# Patient Record
Sex: MALE | Race: WHITE | Employment: UNEMPLOYED | URBAN - NONMETROPOLITAN AREA
[De-identification: names, ages, dates, MRNs, and addresses within clinical notes are randomized per-mention and may not be internally consistent; named-entity substitution may affect disease eponyms.]

---

## 2018-08-09 ENCOUNTER — OFFICE VISIT (OUTPATIENT)
Dept: PSYCHIATRY | Age: 23
End: 2018-08-09
Payer: MEDICAID

## 2018-08-09 DIAGNOSIS — F90.0 ATTENTION DEFICIT HYPERACTIVITY DISORDER (ADHD), PREDOMINANTLY INATTENTIVE TYPE: Primary | ICD-10-CM

## 2018-08-09 DIAGNOSIS — F84.5 ASPERGER'S DISORDER: ICD-10-CM

## 2018-08-09 PROCEDURE — 4004F PT TOBACCO SCREEN RCVD TLK: CPT | Performed by: PSYCHIATRY & NEUROLOGY

## 2018-08-09 PROCEDURE — 90792 PSYCH DIAG EVAL W/MED SRVCS: CPT | Performed by: PSYCHIATRY & NEUROLOGY

## 2018-09-04 DIAGNOSIS — F90.9 ADULT ATTENTION DEFICIT HYPERACTIVITY DISORDER: Primary | ICD-10-CM

## 2018-09-04 NOTE — TELEPHONE ENCOUNTER
Veronica Man called on behalf of patient requesting refill of Concerta XR 54 mg and Risperdal 1 mg.  Veronica Man is asking if medication can be sent to [facility] Appointment Date: 8/9/2018  Next Appointment Date: 9/6/2018

## 2018-09-05 RX ORDER — RISPERIDONE 1 MG/1
1 TABLET, FILM COATED ORAL 2 TIMES DAILY
Qty: 60 TABLET | Refills: 3 | Status: SHIPPED | OUTPATIENT
Start: 2018-09-05 | End: 2019-01-05 | Stop reason: SDUPTHER

## 2018-09-05 RX ORDER — METHYLPHENIDATE HYDROCHLORIDE 54 MG/1
54 TABLET ORAL EVERY MORNING
Qty: 30 TABLET | Refills: 0 | Status: SHIPPED | OUTPATIENT
Start: 2018-09-05 | End: 2018-10-08 | Stop reason: SDUPTHER

## 2018-09-06 ENCOUNTER — OFFICE VISIT (OUTPATIENT)
Dept: PSYCHIATRY | Age: 23
End: 2018-09-06
Payer: MEDICAID

## 2018-09-06 DIAGNOSIS — F90.8 ADHD, ADULT RESIDUAL TYPE: ICD-10-CM

## 2018-09-06 DIAGNOSIS — F84.5 ASPERGER'S DISORDER: Primary | ICD-10-CM

## 2018-09-06 PROCEDURE — G8421 BMI NOT CALCULATED: HCPCS | Performed by: PSYCHIATRY & NEUROLOGY

## 2018-09-06 PROCEDURE — 99212 OFFICE O/P EST SF 10 MIN: CPT | Performed by: PSYCHIATRY & NEUROLOGY

## 2018-09-06 PROCEDURE — 4004F PT TOBACCO SCREEN RCVD TLK: CPT | Performed by: PSYCHIATRY & NEUROLOGY

## 2018-09-06 PROCEDURE — G8428 CUR MEDS NOT DOCUMENT: HCPCS | Performed by: PSYCHIATRY & NEUROLOGY

## 2018-09-06 NOTE — PROGRESS NOTES
Chief Complaint   Patient presents with    1 Month Follow-Up     Asperger's ADHD     Umu Barrera returned after 30 days to follow-up on Asperger's disorder and ADHD. He just got refills of his Concerta and Risperdal yesterday. He has remaining refills of trazodone he was well fixed for medications. He denied any problems with side effects and does describe good benefits with these. He was complaining of his legs itching around the ankles. He speculated that he might of been into some weeds or poison ivy on his walk (he is walking about 10 miles daily). This appears to be a localized and not a generalized rash. That's more consistent with some local irritant than it is with a drug reaction. It did not look like poison ivy however. More like some mosquito bites. I made no changes. He does want to continue coming in at monthly intervals.   He is also seeing Dhara Stewart PhD.    Mental Status Examination    Level of consciousness:  within normal limits  Appearance:  well-appearing, street clothes, in chair, good grooming and good hygiene  Behavior/Motor:  no abnormalities noted  Attitude toward examiner:  cooperative, attentive and poor eye contact  Speech:  spontaneous, normal rate, normal volume and well articulated  Mood:  euthymic  Affect:  mood congruent  Thought processes:  linear, goal directed and coherent  Thought content:  Homocidal ideation: none  Suicidal Ideation:  denies suicidal ideation  Delusions:  no evidence of delusions  Perceptual Disturbance:  denies any perceptual disturbance  Cognition:  oriented to person, place, and time  Concentration succeeded  Memory intact  Fund of knowledge:  good  Abstract thinking:  good  Insight:  good  Judgment:  good    DIAGNOSTIC IMPRESSION  Asperger's disorder  ADHD residual    Plan    No changes  Current Outpatient Prescriptions   Medication Sig Dispense Refill    methylphenidate (CONCERTA) 54 MG extended release tablet Take 1 tablet by mouth every morning for 30 days. . 30 tablet 0    risperiDONE (RISPERDAL) 1 MG tablet Take 1 tablet by mouth 2 times daily 60 tablet 3    methylPREDNISolone (MEDROL, JEREMY,) 4 MG tablet Take by mouth, with food as directed 1 kit 0    B Complex Vitamins (VITAMIN B COMPLEX PO) Take  by mouth.  Multiple Vitamin (MULTI-VITAMIN DAILY PO) Take  by mouth.  Omega-3 Fatty Acids (OMEGA 3 PO) Take  by mouth.  S-Adenosylmethionine (LESA-E) 400 MG TABS Take  by mouth.  vitamin E 400 UNIT capsule Take 400 Units by mouth daily.  cloNIDine (CATAPRES) 0.2 MG tablet Take 0.2 mg by mouth Daily.  QUEtiapine (SEROQUEL) 100 MG tablet Take 200 mg by mouth Daily.  MELATONIN Take  by mouth Daily. No current facility-administered medications for this visit.

## 2018-10-08 ENCOUNTER — OFFICE VISIT (OUTPATIENT)
Dept: PSYCHIATRY | Age: 23
End: 2018-10-08
Payer: MEDICAID

## 2018-10-08 DIAGNOSIS — F84.5 ASPERGER'S DISORDER: Primary | ICD-10-CM

## 2018-10-08 DIAGNOSIS — F90.9 ADULT ATTENTION DEFICIT HYPERACTIVITY DISORDER: ICD-10-CM

## 2018-10-08 PROCEDURE — 99212 OFFICE O/P EST SF 10 MIN: CPT | Performed by: PSYCHIATRY & NEUROLOGY

## 2018-10-08 PROCEDURE — G8421 BMI NOT CALCULATED: HCPCS | Performed by: PSYCHIATRY & NEUROLOGY

## 2018-10-08 PROCEDURE — 4004F PT TOBACCO SCREEN RCVD TLK: CPT | Performed by: PSYCHIATRY & NEUROLOGY

## 2018-10-08 PROCEDURE — G8484 FLU IMMUNIZE NO ADMIN: HCPCS | Performed by: PSYCHIATRY & NEUROLOGY

## 2018-10-08 PROCEDURE — G8428 CUR MEDS NOT DOCUMENT: HCPCS | Performed by: PSYCHIATRY & NEUROLOGY

## 2018-10-08 RX ORDER — METHYLPHENIDATE HYDROCHLORIDE 54 MG/1
54 TABLET ORAL EVERY MORNING
Qty: 30 TABLET | Refills: 0 | Status: SHIPPED | OUTPATIENT
Start: 2018-10-08 | End: 2018-11-09 | Stop reason: SDUPTHER

## 2018-10-08 NOTE — PROGRESS NOTES
Chief Complaint   Patient presents with    1 Month Follow-Up     ASperger's syndrome; ADHD     Aaron Guerrero returned after one month to follow-up on Asperger's and ADHD. They were late and actually missed their appointments, but I was able to work him in later. I was unable to see his father. Aaron Guerrero was complaining that he doesn't fall asleep until very late at night, usually the wee hours of the morning. He has tried staying up all night to make himself tired so that he can go to bed earlier, but it hasn't worked out. I suggested that he try some melatonin about the time that he wants to go to sleep. He said that his father had some. We'll see if that makes any difference. I did refill his Concerta today. He has Risperdal and trazodone he claims. Mental Status Examination    Level of consciousness:  within normal limits  Appearance:  well-appearing, street clothes, in chair, good grooming and good hygiene  Behavior/Motor:  no abnormalities noted  Attitude toward examiner:  cooperative, attentive and good eye contact  Speech:  spontaneous, normal rate, normal volume and well articulated  Mood:  good  Affect:  mood congruent  Thought processes:  linear, goal directed and coherent  Thought content:  Homocidal ideation: good  Suicidal Ideation:  denies suicidal ideation  Delusions:  no evidence of delusions  Perceptual Disturbance:  denies any perceptual disturbance  Cognition:  oriented to person, place, and time  Concentration succeeded  Memory intact  Fund of knowledge:  good  Abstract thinking:  good  Insight:  variable  Judgment:  variable    DIAGNOSTIC IMPRESSION  Asperger's disorder  ADHD residual    Plan  Trial melatonin  Current Outpatient Prescriptions   Medication Sig Dispense Refill    methylphenidate (CONCERTA) 54 MG extended release tablet Take 1 tablet by mouth every morning for 30 days. . 30 tablet 0    risperiDONE (RISPERDAL) 1 MG tablet Take 1 tablet by mouth 2 times daily 60

## 2018-11-09 DIAGNOSIS — F90.9 ADULT ATTENTION DEFICIT HYPERACTIVITY DISORDER: ICD-10-CM

## 2018-11-09 RX ORDER — METHYLPHENIDATE HYDROCHLORIDE 54 MG/1
54 TABLET ORAL EVERY MORNING
Qty: 30 TABLET | Refills: 0 | Status: SHIPPED | OUTPATIENT
Start: 2018-11-09 | End: 2018-12-13 | Stop reason: SDUPTHER

## 2018-11-13 ENCOUNTER — OFFICE VISIT (OUTPATIENT)
Dept: PSYCHIATRY | Age: 23
End: 2018-11-13
Payer: MEDICAID

## 2018-11-13 DIAGNOSIS — F84.5 ASPERGER'S DISORDER: Primary | ICD-10-CM

## 2018-11-13 DIAGNOSIS — F90.8 ADHD, ADULT RESIDUAL TYPE: ICD-10-CM

## 2018-11-13 PROCEDURE — 4004F PT TOBACCO SCREEN RCVD TLK: CPT | Performed by: PSYCHIATRY & NEUROLOGY

## 2018-11-13 PROCEDURE — G8428 CUR MEDS NOT DOCUMENT: HCPCS | Performed by: PSYCHIATRY & NEUROLOGY

## 2018-11-13 PROCEDURE — 99212 OFFICE O/P EST SF 10 MIN: CPT | Performed by: PSYCHIATRY & NEUROLOGY

## 2018-11-13 PROCEDURE — G8484 FLU IMMUNIZE NO ADMIN: HCPCS | Performed by: PSYCHIATRY & NEUROLOGY

## 2018-11-13 PROCEDURE — G8421 BMI NOT CALCULATED: HCPCS | Performed by: PSYCHIATRY & NEUROLOGY

## 2018-12-13 ENCOUNTER — OFFICE VISIT (OUTPATIENT)
Dept: PSYCHIATRY | Age: 23
End: 2018-12-13
Payer: MEDICAID

## 2018-12-13 DIAGNOSIS — F90.9 ADULT ATTENTION DEFICIT HYPERACTIVITY DISORDER: ICD-10-CM

## 2018-12-13 DIAGNOSIS — F84.5 ASPERGER'S DISORDER: Primary | ICD-10-CM

## 2018-12-13 PROCEDURE — G8421 BMI NOT CALCULATED: HCPCS | Performed by: PSYCHIATRY & NEUROLOGY

## 2018-12-13 PROCEDURE — G8428 CUR MEDS NOT DOCUMENT: HCPCS | Performed by: PSYCHIATRY & NEUROLOGY

## 2018-12-13 PROCEDURE — G8484 FLU IMMUNIZE NO ADMIN: HCPCS | Performed by: PSYCHIATRY & NEUROLOGY

## 2018-12-13 PROCEDURE — 99212 OFFICE O/P EST SF 10 MIN: CPT | Performed by: PSYCHIATRY & NEUROLOGY

## 2018-12-13 PROCEDURE — 4004F PT TOBACCO SCREEN RCVD TLK: CPT | Performed by: PSYCHIATRY & NEUROLOGY

## 2018-12-13 RX ORDER — TRAZODONE HYDROCHLORIDE 100 MG/1
100 TABLET ORAL NIGHTLY
Qty: 90 TABLET | Refills: 1 | Status: SHIPPED | OUTPATIENT
Start: 2018-12-13 | End: 2019-05-23 | Stop reason: SDUPTHER

## 2018-12-13 RX ORDER — METHYLPHENIDATE HYDROCHLORIDE 54 MG/1
54 TABLET ORAL EVERY MORNING
Qty: 30 TABLET | Refills: 0 | Status: SHIPPED | OUTPATIENT
Start: 2018-12-13 | End: 2019-01-17 | Stop reason: SDUPTHER

## 2018-12-13 NOTE — PROGRESS NOTES
risperiDONE (RISPERDAL) 1 MG tablet Take 1 tablet by mouth 2 times daily 60 tablet 3    methylPREDNISolone (MEDROL, JEREMY,) 4 MG tablet Take by mouth, with food as directed 1 kit 0    B Complex Vitamins (VITAMIN B COMPLEX PO) Take  by mouth.  Multiple Vitamin (MULTI-VITAMIN DAILY PO) Take  by mouth.  Omega-3 Fatty Acids (OMEGA 3 PO) Take  by mouth.  S-Adenosylmethionine (LESA-E) 400 MG TABS Take  by mouth.  vitamin E 400 UNIT capsule Take 400 Units by mouth daily.  cloNIDine (CATAPRES) 0.2 MG tablet Take 0.2 mg by mouth Daily.  QUEtiapine (SEROQUEL) 100 MG tablet Take 200 mg by mouth Daily.  MELATONIN Take  by mouth Daily. No current facility-administered medications for this visit.

## 2019-01-07 RX ORDER — RISPERIDONE 1 MG/1
TABLET, FILM COATED ORAL
Qty: 60 TABLET | Refills: 1 | Status: SHIPPED | OUTPATIENT
Start: 2019-01-07 | End: 2019-02-21 | Stop reason: SDUPTHER

## 2019-01-17 ENCOUNTER — OFFICE VISIT (OUTPATIENT)
Dept: PSYCHIATRY | Age: 24
End: 2019-01-17
Payer: MEDICAID

## 2019-01-17 DIAGNOSIS — F84.5 ASPERGER'S DISORDER: Primary | ICD-10-CM

## 2019-01-17 DIAGNOSIS — F90.8 ADHD, ADULT RESIDUAL TYPE: ICD-10-CM

## 2019-01-17 PROCEDURE — 99212 OFFICE O/P EST SF 10 MIN: CPT | Performed by: PSYCHIATRY & NEUROLOGY

## 2019-01-17 PROCEDURE — 4004F PT TOBACCO SCREEN RCVD TLK: CPT | Performed by: PSYCHIATRY & NEUROLOGY

## 2019-01-17 PROCEDURE — G8428 CUR MEDS NOT DOCUMENT: HCPCS | Performed by: PSYCHIATRY & NEUROLOGY

## 2019-01-17 PROCEDURE — G8421 BMI NOT CALCULATED: HCPCS | Performed by: PSYCHIATRY & NEUROLOGY

## 2019-01-17 PROCEDURE — G8484 FLU IMMUNIZE NO ADMIN: HCPCS | Performed by: PSYCHIATRY & NEUROLOGY

## 2019-01-17 RX ORDER — METHYLPHENIDATE HYDROCHLORIDE 54 MG/1
54 TABLET ORAL EVERY MORNING
Qty: 30 TABLET | Refills: 0 | Status: SHIPPED | OUTPATIENT
Start: 2019-01-17 | End: 2019-02-21 | Stop reason: SDUPTHER

## 2019-02-21 ENCOUNTER — OFFICE VISIT (OUTPATIENT)
Dept: PSYCHIATRY | Age: 24
End: 2019-02-21
Payer: MEDICAID

## 2019-02-21 DIAGNOSIS — F90.8 ADHD, ADULT RESIDUAL TYPE: ICD-10-CM

## 2019-02-21 DIAGNOSIS — F84.5 ASPERGER'S DISORDER: Primary | ICD-10-CM

## 2019-02-21 PROCEDURE — G8421 BMI NOT CALCULATED: HCPCS | Performed by: PSYCHIATRY & NEUROLOGY

## 2019-02-21 PROCEDURE — G8428 CUR MEDS NOT DOCUMENT: HCPCS | Performed by: PSYCHIATRY & NEUROLOGY

## 2019-02-21 PROCEDURE — G8484 FLU IMMUNIZE NO ADMIN: HCPCS | Performed by: PSYCHIATRY & NEUROLOGY

## 2019-02-21 PROCEDURE — 99212 OFFICE O/P EST SF 10 MIN: CPT | Performed by: PSYCHIATRY & NEUROLOGY

## 2019-02-21 PROCEDURE — 4004F PT TOBACCO SCREEN RCVD TLK: CPT | Performed by: PSYCHIATRY & NEUROLOGY

## 2019-02-21 RX ORDER — RISPERIDONE 1 MG/1
TABLET, FILM COATED ORAL
Qty: 60 TABLET | Refills: 5 | Status: SHIPPED | OUTPATIENT
Start: 2019-02-21 | End: 2019-10-10 | Stop reason: SDUPTHER

## 2019-02-21 RX ORDER — METHYLPHENIDATE HYDROCHLORIDE 54 MG/1
54 TABLET ORAL EVERY MORNING
Qty: 30 TABLET | Refills: 0 | Status: SHIPPED | OUTPATIENT
Start: 2019-02-21 | End: 2019-04-09 | Stop reason: SDUPTHER

## 2019-04-09 DIAGNOSIS — F90.8 ADHD, ADULT RESIDUAL TYPE: ICD-10-CM

## 2019-04-09 RX ORDER — METHYLPHENIDATE HYDROCHLORIDE 54 MG/1
54 TABLET ORAL EVERY MORNING
Qty: 30 TABLET | Refills: 0 | Status: SHIPPED | OUTPATIENT
Start: 2019-04-09 | End: 2019-05-16

## 2019-04-09 NOTE — TELEPHONE ENCOUNTER
Matthew Talbert called requesting a refill of Concerta 29IX      Last Appointment Date: 2/21/2019  Next Appointment Date: 5/23/2019

## 2019-05-16 DIAGNOSIS — F90.8 ADHD, ADULT RESIDUAL TYPE: ICD-10-CM

## 2019-05-16 RX ORDER — METHYLPHENIDATE HYDROCHLORIDE 54 MG/1
54 TABLET ORAL EVERY MORNING
Qty: 30 TABLET | Refills: 0 | Status: SHIPPED | OUTPATIENT
Start: 2019-05-16 | End: 2019-07-10 | Stop reason: SDUPTHER

## 2019-05-16 NOTE — TELEPHONE ENCOUNTER
Odessia Nissen called in requesting a refill on Amauri Bobby was seen in the office on 2/21 and scheduled back on 5/23

## 2019-05-23 ENCOUNTER — OFFICE VISIT (OUTPATIENT)
Dept: PSYCHIATRY | Age: 24
End: 2019-05-23
Payer: MEDICAID

## 2019-05-23 DIAGNOSIS — F84.5 ASPERGER'S DISORDER: Primary | ICD-10-CM

## 2019-05-23 DIAGNOSIS — F90.8 ADHD, ADULT RESIDUAL TYPE: ICD-10-CM

## 2019-05-23 PROCEDURE — 99212 OFFICE O/P EST SF 10 MIN: CPT | Performed by: PSYCHIATRY & NEUROLOGY

## 2019-05-23 PROCEDURE — G8428 CUR MEDS NOT DOCUMENT: HCPCS | Performed by: PSYCHIATRY & NEUROLOGY

## 2019-05-23 PROCEDURE — G8421 BMI NOT CALCULATED: HCPCS | Performed by: PSYCHIATRY & NEUROLOGY

## 2019-05-23 PROCEDURE — 4004F PT TOBACCO SCREEN RCVD TLK: CPT | Performed by: PSYCHIATRY & NEUROLOGY

## 2019-05-23 RX ORDER — LANOLIN ALCOHOL/MO/W.PET/CERES
3 CREAM (GRAM) TOPICAL DAILY
Qty: 30 MG | Refills: 3 | Status: SHIPPED | OUTPATIENT
Start: 2019-05-23 | End: 2019-10-10 | Stop reason: SDUPTHER

## 2019-05-23 RX ORDER — TRAZODONE HYDROCHLORIDE 100 MG/1
100 TABLET ORAL NIGHTLY
Qty: 90 TABLET | Refills: 1 | Status: SHIPPED | OUTPATIENT
Start: 2019-05-23

## 2019-05-23 NOTE — TELEPHONE ENCOUNTER
Yisel contacted office requesting melatonin be ordered as 3mg (for insurance to cover) on Praveen`s behalf, loaded pending your approval

## 2019-05-23 NOTE — PROGRESS NOTES
Chief Complaint   Patient presents with    3 Month Follow-Up     Asperger's ADHD     Jose Francisco Navarro returned after 90 days to follow-up on Asperger's and ADHD. He had run out of one of his medications and I think the Concerta, for just a couple of days. Filled last week for him. Today I ordered melatonin and trazodone. No changes were made in the dosing. He has ample refills of Risperdal.    He denies any side effects and says everything is working well. He is not reporting symptoms or any problems with concentration and focus. He did not desire any changes. I will make an effort to put him into 90 day quantities for convenience. Mental Status Examination    Level of consciousness:  within normal limits  Appearance:  well-appearing, street clothes, in chair, good grooming and good hygiene  Behavior/Motor:  no abnormalities noted  Attitude toward examiner:  cooperative, attentive and good eye contact  Speech:  spontaneous, normal rate, normal volume and well articulated  Mood:  flat  Affect:  flat  Thought processes:  linear, goal directed and coherent  Thought content:  Homocidal ideation: none  Suicidal Ideation:  denies suicidal ideation  Delusions:  no evidence of delusions  Perceptual Disturbance:  denies any perceptual disturbance  Cognition:  oriented to person, place, and time  Concentration succeeded  Memory Not tested no apparent problem on casual observation\  Fund of knowledge:  good  Abstract thinking:  fair  Insight:  good  Judgment:  good    DIAGNOSTIC IMPRESSION  Asperger's disorder  ADHD    Plan  No changes  Current Outpatient Medications   Medication Sig Dispense Refill    Melatonin 2.5 MG CHEW Take 2.5 mg by mouth nightly 30 tablet 5    traZODone (DESYREL) 100 MG tablet Take 1 tablet by mouth nightly 90 tablet 1    methylphenidate (CONCERTA) 54 MG extended release tablet Take 1 tablet by mouth every morning for 30 days.  30 tablet 0    risperiDONE (RISPERDAL) 1 MG tablet TAKE 1 TABLET BY

## 2019-07-10 DIAGNOSIS — F90.8 ADHD, ADULT RESIDUAL TYPE: ICD-10-CM

## 2019-07-10 RX ORDER — METHYLPHENIDATE HYDROCHLORIDE 54 MG/1
54 TABLET ORAL EVERY MORNING
Qty: 30 TABLET | Refills: 0 | Status: SHIPPED | OUTPATIENT
Start: 2019-07-10 | End: 2019-10-10 | Stop reason: SDUPTHER

## 2019-10-10 ENCOUNTER — OFFICE VISIT (OUTPATIENT)
Dept: PSYCHIATRY | Age: 24
End: 2019-10-10
Payer: MEDICAID

## 2019-10-10 ENCOUNTER — TELEPHONE (OUTPATIENT)
Dept: PSYCHIATRY | Age: 24
End: 2019-10-10

## 2019-10-10 DIAGNOSIS — F84.5 ASPERGER'S DISORDER: ICD-10-CM

## 2019-10-10 DIAGNOSIS — F90.8 ADHD, ADULT RESIDUAL TYPE: Primary | ICD-10-CM

## 2019-10-10 PROCEDURE — 4004F PT TOBACCO SCREEN RCVD TLK: CPT | Performed by: PSYCHIATRY & NEUROLOGY

## 2019-10-10 PROCEDURE — G8484 FLU IMMUNIZE NO ADMIN: HCPCS | Performed by: PSYCHIATRY & NEUROLOGY

## 2019-10-10 PROCEDURE — G8421 BMI NOT CALCULATED: HCPCS | Performed by: PSYCHIATRY & NEUROLOGY

## 2019-10-10 PROCEDURE — G8428 CUR MEDS NOT DOCUMENT: HCPCS | Performed by: PSYCHIATRY & NEUROLOGY

## 2019-10-10 PROCEDURE — 99212 OFFICE O/P EST SF 10 MIN: CPT | Performed by: PSYCHIATRY & NEUROLOGY

## 2019-10-10 RX ORDER — RISPERIDONE 1 MG/1
TABLET, FILM COATED ORAL
Qty: 180 TABLET | Refills: 3 | Status: SHIPPED | OUTPATIENT
Start: 2019-10-10

## 2019-10-10 RX ORDER — LANOLIN ALCOHOL/MO/W.PET/CERES
3 CREAM (GRAM) TOPICAL DAILY
Qty: 90 MG | Refills: 3 | Status: SHIPPED | OUTPATIENT
Start: 2019-10-10

## 2019-10-10 RX ORDER — METHYLPHENIDATE HYDROCHLORIDE 54 MG/1
54 TABLET ORAL EVERY MORNING
Qty: 90 TABLET | Refills: 0 | Status: SHIPPED | OUTPATIENT
Start: 2019-10-10 | End: 2020-01-08